# Patient Record
Sex: FEMALE | Race: WHITE
[De-identification: names, ages, dates, MRNs, and addresses within clinical notes are randomized per-mention and may not be internally consistent; named-entity substitution may affect disease eponyms.]

---

## 2018-05-02 ENCOUNTER — HOSPITAL ENCOUNTER (OUTPATIENT)
Dept: HOSPITAL 76 - DI | Age: 33
Discharge: HOME | End: 2018-05-02
Attending: FAMILY MEDICINE
Payer: COMMERCIAL

## 2018-05-02 DIAGNOSIS — M77.31: ICD-10-CM

## 2018-05-02 DIAGNOSIS — M79.674: Primary | ICD-10-CM

## 2018-05-02 NOTE — XRAY REPORT
THREE VIEW RIGHT FOOT:  05/02/2018

 

CLINICAL INDICATION:  Right great toe pain.

 

FINDINGS:  AP, lateral, oblique views of the right foot demonstrate no evidence of fracture or 

dislocation.  The joint spaces are preserved.  A small plantar calcaneal spur is noted.

 

IMPRESSION:  SMALL PLANTAR CALCANEAL SPUR.  NO EVIDENCE OF FRACTURE.

 

 

DD: 05/02/2018 12:30

TD: 05/02/2018 12:41

Job #: [id number]